# Patient Record
Sex: MALE | Race: WHITE
[De-identification: names, ages, dates, MRNs, and addresses within clinical notes are randomized per-mention and may not be internally consistent; named-entity substitution may affect disease eponyms.]

---

## 2018-09-28 ENCOUNTER — HOSPITAL ENCOUNTER (EMERGENCY)
Dept: HOSPITAL 62 - ER | Age: 53
Discharge: HOME | End: 2018-09-28
Payer: COMMERCIAL

## 2018-09-28 VITALS — SYSTOLIC BLOOD PRESSURE: 130 MMHG | DIASTOLIC BLOOD PRESSURE: 87 MMHG

## 2018-09-28 DIAGNOSIS — R07.9: Primary | ICD-10-CM

## 2018-09-28 DIAGNOSIS — F17.200: ICD-10-CM

## 2018-09-28 LAB
ADD MANUAL DIFF: NO
ANION GAP SERPL CALC-SCNC: 7 MMOL/L (ref 5–19)
BASOPHILS # BLD AUTO: 0.1 10^3/UL (ref 0–0.2)
BASOPHILS NFR BLD AUTO: 1.1 % (ref 0–2)
BUN SERPL-MCNC: 12 MG/DL (ref 7–20)
CALCIUM: 9.5 MG/DL (ref 8.4–10.2)
CHLORIDE SERPL-SCNC: 106 MMOL/L (ref 98–107)
CO2 SERPL-SCNC: 26 MMOL/L (ref 22–30)
EOSINOPHIL # BLD AUTO: 0.1 10^3/UL (ref 0–0.6)
EOSINOPHIL NFR BLD AUTO: 1.8 % (ref 0–6)
ERYTHROCYTE [DISTWIDTH] IN BLOOD BY AUTOMATED COUNT: 13.5 % (ref 11.5–14)
GLUCOSE SERPL-MCNC: 86 MG/DL (ref 75–110)
HCT VFR BLD CALC: 45.2 % (ref 37.9–51)
HGB BLD-MCNC: 15.4 G/DL (ref 13.5–17)
LYMPHOCYTES # BLD AUTO: 1.8 10^3/UL (ref 0.5–4.7)
LYMPHOCYTES NFR BLD AUTO: 30.1 % (ref 13–45)
MCH RBC QN AUTO: 31 PG (ref 27–33.4)
MCHC RBC AUTO-ENTMCNC: 34.1 G/DL (ref 32–36)
MCV RBC AUTO: 91 FL (ref 80–97)
MONOCYTES # BLD AUTO: 0.5 10^3/UL (ref 0.1–1.4)
MONOCYTES NFR BLD AUTO: 8.8 % (ref 3–13)
NEUTROPHILS # BLD AUTO: 3.5 10^3/UL (ref 1.7–8.2)
NEUTS SEG NFR BLD AUTO: 58.2 % (ref 42–78)
PLATELET # BLD: 304 10^3/UL (ref 150–450)
POTASSIUM SERPL-SCNC: 4.4 MMOL/L (ref 3.6–5)
RBC # BLD AUTO: 4.98 10^6/UL (ref 4.35–5.55)
SODIUM SERPL-SCNC: 139.1 MMOL/L (ref 137–145)
TOTAL CELLS COUNTED % (AUTO): 100 %
WBC # BLD AUTO: 6 10^3/UL (ref 4–10.5)

## 2018-09-28 PROCEDURE — 80048 BASIC METABOLIC PNL TOTAL CA: CPT

## 2018-09-28 PROCEDURE — 84484 ASSAY OF TROPONIN QUANT: CPT

## 2018-09-28 PROCEDURE — 71045 X-RAY EXAM CHEST 1 VIEW: CPT

## 2018-09-28 PROCEDURE — 93010 ELECTROCARDIOGRAM REPORT: CPT

## 2018-09-28 PROCEDURE — 85379 FIBRIN DEGRADATION QUANT: CPT

## 2018-09-28 PROCEDURE — 36415 COLL VENOUS BLD VENIPUNCTURE: CPT

## 2018-09-28 PROCEDURE — 85025 COMPLETE CBC W/AUTO DIFF WBC: CPT

## 2018-09-28 PROCEDURE — 99285 EMERGENCY DEPT VISIT HI MDM: CPT

## 2018-09-28 PROCEDURE — 93005 ELECTROCARDIOGRAM TRACING: CPT

## 2018-09-28 NOTE — ER DOCUMENT REPORT
ED General





- General


Chief Complaint: Chest Pain


Stated Complaint: CHEST PAIN


Time Seen by Provider: 09/28/18 08:12


TRAVEL OUTSIDE OF THE [place] IN LAST 30 DAYS: No





- HPI


Notes: 





Patient is a 53-year-old male that presents to the emergency department for 

chief complaint of chest pain and hypertension.


Chest pain is left-sided, intermittent and currently not present.  He states it 

occurs when he is standing and exerting himself.  The pain does not radiate 

from his left anterior chest.  He also reports some worsening of pain with deep 

inspiration.  He denies relieving factors.  He denies history of cardiac 

disease or chronic medical illness.  He has never had a stress test done.  He 

reports a strong family history of cancer but no personal history of cancer.  

He has not recently had any travel, surgery, or imobilizations.  Patient states 

his roommate took his blood pressure on a home BP machine and it was 117/115.  

He denies history of elevated blood pressure in the past.





Past Medical History: Negative





Past Surgical History: Pilonidal cyst removal





Social History: Daily tobacco and marijuana.  Denies alcohol use





Family History: Reviewed and noncontributory for presenting illness





Allergies: Reviewed, see documented allergy list.








REVIEW OF SYSTEMS:





CONSTITUTIONAL : 





No fever





No chills





No diaphoresis





No recent illness





EENT:





No vision changes





No congestion





No sore throat  





CARDIOVASCULAR:





 chest pain





No palpitations





RESPIRATORY:





No shortness of breath





No cough





No difficulty breathing





GASTROINTESTINAL: 





No abdominal pain





No nausea





No vomiting





No diarrhea





GENITOURINARY:





No dysuria





No hematuria





No difficulty urinating





MUSCULOSKELETAL:





No back pain





No leg pain





No arm pain





SKIN:  





No rashes





No lesions





LYMPHATIC: 





No swollen, enlarged glands.





NEUROLOGICAL: 





No lightheadedness





No headache





No weakness





No paresthesias





PSYCHIATRIC:





No anxiety





No depression 








PHYSICAL EXAMINATION:





Vital signs reviewed, nursing noted reviewed.





GENERAL: Well-appearing, well-nourished and in no acute distress.





HEAD: Atraumatic, normocephalic.





EYES: Eyes appear normal, extraocular movements intact, sclera anicteric, 

conjunctiva are normal.





ENT: nares patent, oropharynx clear without exudates.  Moist mucous membranes.





NECK: Normal range of motion, supple without lymphadenopathy





LUNGS: Breath sounds clear to auscultation bilaterally and equal.  No wheezes 

rales or rhonchi.





HEART: Regular rate and rhythm without murmurs





ABDOMEN: Soft, nontender, normoactive bowel sounds.  No rebound, guarding, or 

rigidity. No masses appreciated.





EXTREMITIES: Nontender, good range of motion, no pitting or edema. 





NEUROLOGICAL: No focal neurological deficits. Moves all extremities 

spontaneously Motor and sensory grossly intact on exam.





PSYCH: Normal mood, normal affect.





SKIN: Warm, Dry, normal turgor, no rashes or lesions noted on exposed skin











- Related Data


Allergies/Adverse Reactions: 


 





No Known Allergies Allergy (Verified 09/28/18 07:44)


 











Past Medical History





- Social History


Smoking Status: Current Every Day Smoker


Frequency of alcohol use: Rare


Drug Abuse: Marijuana


Family History: Reviewed & Not Pertinent


Patient has suicidal ideation: No


Patient has homicidal ideation: No


Renal/ Medical History: Denies: Hx Peritoneal Dialysis


Past Surgical History: Reports: Hx Appendectomy





Review of Systems





- Review of Systems


Notes: 





Dictated





Physical Exam





- Vital signs


Vitals: 


 











Temp Pulse Resp BP Pulse Ox


 


 97.9 F   74   18   129/96 H  100 


 


 09/28/18 08:00  09/28/18 08:00  09/28/18 08:00  09/28/18 08:00  09/28/18 08:00














- Notes


Notes: 





Dictated





Course





- Re-evaluation


Re-evalutation: 





09/28/18 08:58


Vitals reviewed.  Nursing notes reviewed.  Patient's blood pressure is normal 

here.  The reading of 117/115 obtained at home blood pressure cuff was likely 

in error.  His EKG was reviewed and shows no acute ischemia or dysrhythmia.  

Patient given aspirin for his chest pain.


09/28/18 09:32


On reevaluation patient is asymptomatic.  He has remained hemodynamically 

stable.  His heart score is 2 putting him at low risk for MACE.  Patient was 

offered second troponin in the emergency room but has declined.  He will follow 

with primary care and obtain outpatient stress test.  He will return for new or 

worsening symptoms.  He was stable at time of discharge.





Laboratory











  09/28/18 09/28/18 09/28/18





  08:30 08:30 08:30


 


WBC  6.0  


 


RBC  4.98  


 


Hgb  15.4  


 


Hct  45.2  


 


MCV  91  


 


MCH  31.0  


 


MCHC  34.1  


 


RDW  13.5  


 


Plt Count  304  


 


Seg Neutrophils %  58.2  


 


Lymphocytes %  30.1  


 


Monocytes %  8.8  


 


Eosinophils %  1.8  


 


Basophils %  1.1  


 


Absolute Neutrophils  3.5  


 


Absolute Lymphocytes  1.8  


 


Absolute Monocytes  0.5  


 


Absolute Eosinophils  0.1  


 


Absolute Basophils  0.1  


 


D-Dimer   


 


Sodium   139.1 


 


Potassium   4.4 


 


Chloride   106 


 


Carbon Dioxide   26 


 


Anion Gap   7 


 


BUN   12 


 


Creatinine   1.05 


 


Est GFR ( Amer)   > 60 


 


Est GFR (Non-Af Amer)   > 60 


 


Glucose   86 


 


Calcium   9.5 


 


Troponin I    < 0.012














  09/28/18





  08:30


 


WBC 


 


RBC 


 


Hgb 


 


Hct 


 


MCV 


 


MCH 


 


MCHC 


 


RDW 


 


Plt Count 


 


Seg Neutrophils % 


 


Lymphocytes % 


 


Monocytes % 


 


Eosinophils % 


 


Basophils % 


 


Absolute Neutrophils 


 


Absolute Lymphocytes 


 


Absolute Monocytes 


 


Absolute Eosinophils 


 


Absolute Basophils 


 


D-Dimer  < 0.27


 


Sodium 


 


Potassium 


 


Chloride 


 


Carbon Dioxide 


 


Anion Gap 


 


BUN 


 


Creatinine 


 


Est GFR ( Amer) 


 


Est GFR (Non-Af Amer) 


 


Glucose 


 


Calcium 


 


Troponin I 














 





Chest X-Ray  09/28/18 08:13


IMPRESSION:  NO ACUTE RADIOGRAPHIC FINDING IN THE CHEST.


 














- Vital Signs


Vital signs: 


 











Temp Pulse Resp BP Pulse Ox


 


 97.9 F   74   11 L  144/82 H  98 


 


 09/28/18 08:00  09/28/18 08:00  09/28/18 08:57  09/28/18 08:57  09/28/18 08:57














- Laboratory


Result Diagrams: 


 09/28/18 08:30





 09/28/18 08:30





- EKG Interpretation by Me


Additional EKG results interpreted by me: 





09/28/18 09:01


0752:normal sinus rhythm, normal axis, no ectopy, rate 75, no ST elevation





Discharge





- Discharge


Clinical Impression: 


Chest pain


Qualifiers:


 Chest pain type: unspecified Qualified Code(s): R07.9 - Chest pain, unspecified





Condition: Stable


Disposition: HOME, SELF-CARE


Instructions:  Chest Pain of Unclear Cause ([facility]), Family Physicians / Practices


Additional Instructions: 


Please return to the emergency department if you have any worsening, or concern 

of your symptoms.





Please return to the emergency department if you develop chest pain, difficulty 

breathing, severe abdominal pain, or ongoing vomiting.





Please follow-up with your primary care physician in 2-3 days and any other 

recommended physicians.





If prescribed, take all medications as directed. 





If you have any questions or concerns do not hesitate to return the emergency 

department for evaluation.





You need to be evaluated by primary care physician and obtain a stress test in 

the next 1-2 weeks

## 2018-09-28 NOTE — RADIOLOGY REPORT (SQ)
EXAM DESCRIPTION:  CHEST SINGLE VIEW



COMPLETED DATE/TIME:  9/28/2018 8:36 am



REASON FOR STUDY:  chest pain



COMPARISON:  None.



EXAM PARAMETERS:  NUMBER OF VIEWS: One view.

TECHNIQUE: Single frontal radiographic view of the chest acquired.

RADIATION DOSE: NA

LIMITATIONS: None.



FINDINGS:  LUNGS AND PLEURA: No opacities, masses or pneumothorax. No pleural effusion.

MEDIASTINUM AND HILAR STRUCTURES: No masses.  Contour normal.

HEART AND VASCULAR STRUCTURES: Heart normal in size.  Normal vasculature.

BONES: No acute findings.

HARDWARE: None in the chest.

OTHER: No other significant finding.



IMPRESSION:  NO ACUTE RADIOGRAPHIC FINDING IN THE CHEST.



TECHNICAL DOCUMENTATION:  JOB ID:  [id number]

 2011 [employer]- All Rights Reserved



Reading location - IP/workstation name: [email or web address]